# Patient Record
Sex: MALE | Race: WHITE | Employment: FULL TIME | URBAN - METROPOLITAN AREA
[De-identification: names, ages, dates, MRNs, and addresses within clinical notes are randomized per-mention and may not be internally consistent; named-entity substitution may affect disease eponyms.]

---

## 2017-03-10 ENCOUNTER — OFFICE VISIT (OUTPATIENT)
Dept: INTERNAL MEDICINE CLINIC | Age: 57
End: 2017-03-10

## 2017-03-10 VITALS
SYSTOLIC BLOOD PRESSURE: 126 MMHG | HEIGHT: 68 IN | HEART RATE: 87 BPM | TEMPERATURE: 97.7 F | DIASTOLIC BLOOD PRESSURE: 80 MMHG | BODY MASS INDEX: 30.19 KG/M2 | WEIGHT: 199.2 LBS | RESPIRATION RATE: 18 BRPM

## 2017-03-10 DIAGNOSIS — Z00.00 PREVENTATIVE HEALTH CARE: Primary | ICD-10-CM

## 2017-03-10 DIAGNOSIS — E78.2 MIXED HYPERLIPIDEMIA: ICD-10-CM

## 2017-03-10 LAB
CHOLESTEROL, TOTAL: 136 MG/DL (ref 0–199)
GLUCOSE BLD-MCNC: 100 MG/DL (ref 70–99)
HDLC SERPL-MCNC: 40 MG/DL (ref 40–60)
LDL CHOLESTEROL CALCULATED: 58 MG/DL
TRIGL SERPL-MCNC: 192 MG/DL (ref 0–150)
VLDLC SERPL CALC-MCNC: 38 MG/DL

## 2017-03-10 PROCEDURE — 99396 PREV VISIT EST AGE 40-64: CPT | Performed by: FAMILY MEDICINE

## 2017-03-10 PROCEDURE — 36415 COLL VENOUS BLD VENIPUNCTURE: CPT | Performed by: FAMILY MEDICINE

## 2017-03-10 RX ORDER — ACETAMINOPHEN 160 MG
1 TABLET,DISINTEGRATING ORAL DAILY
COMMUNITY
Start: 2016-12-23

## 2017-03-10 RX ORDER — ATORVASTATIN CALCIUM 20 MG/1
TABLET, FILM COATED ORAL
Qty: 30 TABLET | Refills: 11 | Status: SHIPPED | OUTPATIENT
Start: 2017-03-10

## 2017-03-10 ASSESSMENT — ENCOUNTER SYMPTOMS
DIARRHEA: 0
COUGH: 0
WHEEZING: 0
SHORTNESS OF BREATH: 0
VOMITING: 0
ABDOMINAL PAIN: 0
SORE THROAT: 0
BACK PAIN: 0

## 2017-03-11 ENCOUNTER — TELEPHONE (OUTPATIENT)
Dept: INTERNAL MEDICINE CLINIC | Age: 57
End: 2017-03-11

## 2024-03-07 LAB
ALBUMIN URINE, EXTERNAL: 4.6
CREATININE, URINE, EXTERNAL: 131.4
MICROALBUMIN/CREAT UR: 35 MG/G{CREAT}

## 2024-09-25 LAB
ESTIMATED AVERAGE GLUCOSE: 128
HBA1C MFR BLD: 6.1 %

## 2025-01-21 ENCOUNTER — HOSPITAL ENCOUNTER (EMERGENCY)
Age: 65
Discharge: HOME | End: 2025-01-21
Attending: EMERGENCY MEDICINE
Payer: MEDICARE

## 2025-01-21 VITALS
OXYGEN SATURATION: 99 % | RESPIRATION RATE: 16 BRPM | SYSTOLIC BLOOD PRESSURE: 155 MMHG | DIASTOLIC BLOOD PRESSURE: 80 MMHG | WEIGHT: 192.8 LBS | HEART RATE: 87 BPM | TEMPERATURE: 97.5 F | BODY MASS INDEX: 29.22 KG/M2 | HEIGHT: 68 IN

## 2025-01-21 DIAGNOSIS — R13.10 ODYNOPHAGIA: Primary | ICD-10-CM

## 2025-01-21 DIAGNOSIS — R03.0 ELEVATED BLOOD PRESSURE READING: ICD-10-CM

## 2025-01-21 PROCEDURE — 99282 EMERGENCY DEPT VISIT SF MDM: CPT

## 2025-01-21 ASSESSMENT — PAIN - FUNCTIONAL ASSESSMENT: PAIN_FUNCTIONAL_ASSESSMENT: 0-10

## 2025-01-21 ASSESSMENT — PAIN DESCRIPTION - LOCATION: LOCATION: THROAT

## 2025-01-21 ASSESSMENT — PAIN SCALES - GENERAL: PAINLEVEL_OUTOF10: 3

## 2025-01-21 NOTE — DISCHARGE INSTRUCTIONS
Please return for fever, chest pain, difficulty breathing, or should your symptoms worsen or change.  The GI doctor asked that you be on a liquid diet tonight.  Tomorrow morning if you feel normal you can resume your normal diet.  If not, call the office first thing in the morning to let them know.

## 2025-01-21 NOTE — ED NOTES
@1814 Called GI per   RE: [facility] please  @1825  called back from [facility] and spoke to

## 2025-01-21 NOTE — ED PROVIDER NOTES
EMERGENCY DEPARTMENT ENCOUNTER     [facility] EMERGENCY DEPARTMENT     Pt Name: Yohannes Wilson   MRN: [id number]   Birthdate 1960   Date of evaluation: 1/21/2025   Provider: Timur Parsons MD   PCP: Greer Paige MD   Note Started: 6:32 PM EST 1/21/25     CHIEF COMPLAINT     Chief Complaint   Patient presents with    Airway Obstruction     Patient has a piece of chicken from a chicken fajita stuck in his throat from approx 20 minutes PTA.   Tried to drink water but states it came right back up. Denies any difficulty breathing but states it is uncomfortable when he tries to swallow his saliva.         HISTORY OF PRESENT ILLNESS:  History from : Patient   Limitations to history : None     Yohannes Wilson is a 64 y.o. male who presents to the emergency department with concern for an esophageal foreign body.  The patient states he was eating fajita dinner this evening when he felt like something got hung in the top of his throat just behind his sternum.  He states initially he is unable to swallow liquids.  He is currently tolerating his secretions.  I asked with the patient and the patient's wife and they are both adamant that there were no bones in the chicken meat.  No chest pain.  No shortness of breath.  He states this happened once before several months ago when he was eating some type of beef.    Nursing Notes were all reviewed and agreed with or any disagreements were addressed in the HPI.     ROS: Positives and Pertinent negatives as per HPI.    PAST MEDICAL HISTORY     PHYSICAL EXAM:  ED Triage Vitals   BP Systolic BP Percentile Diastolic BP Percentile Temp Temp Source Pulse Respirations SpO2   01/21/25 1753 -- -- 01/21/25 1753 01/21/25 1753 01/21/25 1753 01/21/25 1753 01/21/25 1753   (!) 178/91   97.5 °F (36.4 °C) Oral 100 18 98 %      Height Weight - Scale         01/21/25 1751 01/21/25 1751         1.727 m (5' 8\") 87.5 kg (192 lb 12.8 oz)              Physical Exam   PHYSICAL